# Patient Record
Sex: FEMALE | Race: WHITE | NOT HISPANIC OR LATINO | URBAN - METROPOLITAN AREA
[De-identification: names, ages, dates, MRNs, and addresses within clinical notes are randomized per-mention and may not be internally consistent; named-entity substitution may affect disease eponyms.]

---

## 2024-11-07 ENCOUNTER — LAB REQUISITION (OUTPATIENT)
Dept: DERMATOPATHOLOGY | Facility: CLINIC | Age: 39
End: 2024-11-07
Payer: COMMERCIAL

## 2024-11-07 DIAGNOSIS — D23.5 OTHER BENIGN NEOPLASM OF SKIN OF TRUNK: ICD-10-CM

## 2024-11-07 DIAGNOSIS — C43.72 MALIGNANT MELANOMA OF LEFT LOWER LIMB, INCLUDING HIP (MULTI): ICD-10-CM

## 2024-11-07 DIAGNOSIS — D23.61 OTHER BENIGN NEOPLASM OF SKIN OF RIGHT UPPER LIMB, INCLUDING SHOULDER: ICD-10-CM

## 2024-11-07 PROCEDURE — 88321 CONSLTJ&REPRT SLD PREP ELSWR: CPT | Performed by: DERMATOLOGY

## 2024-11-08 LAB
PATH REPORT.FINAL DX SPEC: NORMAL
PATH REPORT.GROSS SPEC: NORMAL
PATH REPORT.MICROSCOPIC SPEC OTHER STN: NORMAL
PATH REPORT.RELEVANT HX SPEC: NORMAL
PATH REPORT.TOTAL CANCER: NORMAL
PATHOLOGY SYNOPTIC REPORT: NORMAL

## 2024-11-10 DIAGNOSIS — C43.9 MELANOMA OF SKIN (MULTI): ICD-10-CM

## 2024-11-10 NOTE — TUMOR BOARD NOTE
General Patient Information  Name:  Gemma Riggs  Evaluation #:  1  Conference Date:  11/11/2024  YOB: 1985  MRN:  [id number]  Program Physician(s):  Neal Rodriguez  Referring Physician(s):  Rosa M Peterson      Summary   Stage:  Reji ([id number]) ; Melanoma 5 year survival: 99%    Assessment:  Malignant melanoma of the left anterior distal thigh, Breslow thickness 0.2 mm in association with a dermal nevus, without ulceration.    Recommendation:  WLE with 1 cm margins.    Review Multidisciplinary Cutaneous Oncology Conference recommendation with patient.  Continue routine follow up and total body skin exams with Rosa M Saunders.    Follow Up:  Malini Rubi.      History and Physical Exam  Dermatologic History:   39 y.o. female with a biopsy of the left anterior distal thigh on 10/22/2024 showing a a non-ulcerated melanoma, superficial spreading type, Breslow: 0.2 mm.    She is scheduled for a WLE with Dr. Stone on 11/20/2024.        Pathology  Derm Consult: [id number]  Order: [id number]   Collected 11/7/2024 12:41       Status: Final result       Visible to patient: Yes (seen)       Dx: Other benign neoplasm of skin of trun...    0 Result Notes      Component    FINAL DIAGNOSIS   8 SLIDES, DERMATOPATHOLOGY LAB[facility]ATORY OF [facility], #[id number] (BX: 10/22/2024)     A. SKIN, LEFT ANTERIOR DISTAL THIGH, SHAVE BIOPSY:  MALIGNANT MELANOMA, BRESLOW THICKNESS 0.2 MM IN ASSOCIATION WITH A DERMAL NEVUS, PRESENT ON THE DEEP AND PERIPHERAL MARGIN, SEE NOTE.     Note: Microscopic examination reveals a specimen that extends into the superficial dermis. There is an asymmetric proliferation of nested and single atypical melanocytes throughout all layers of the epidermis. There are occasional atypical melanocytes in the superficial dermis in addition to more benign appearing melanocytes. The melanocytes stain with antibodies against SOX-10. A MART1/Ki-67 stain was reviewed. The atypical  melanocytes stain weakly with antibodies against p16, while the benign appearing melanocytes stain strongly with antibodies against p16. The epidermal melanocytes stain with antibodies against PRAME as do some of the dermal melanocytes.      B. SKIN, RIGHT SUPERIOR UPPER BACK, SHAVE BIOPSY:  DERMAL NEVUS, PRESENT ON THE DEEP MARGIN.     C. SKIN, RIGHT POSTERIOR SHOULDER, SHAVE BIOPSY:  DERMAL NEVUS, PRESENT ON THE DEEP MARGIN.     ** Electronically signed out by Christopher Tavera MD **            Electronically signed by Christopher Tavera MD on 11/8/2024 at 1520   Case Summary Report   MELANOMA OF THE SKIN: Biopsy   8th Edition - Protocol posted: 3/23/2022MELANOMA OF THE SKIN: BIOPSY - All Specimens  SPECIMEN   Procedure  Biopsy, shave   Specimen Laterality  Left   TUMOR   Tumor Site  Skin of lower limb and hip: Left anterior distal thigh        Histologic Type  Superficial spreading melanoma (low-cumulative sun damage (CSD) melanoma)   Maximum Tumor (Breslow) Thickness (Millimeters)  0.2 mm   Ulceration  Not identified   Anatomic (Anand) Level  II (melanoma present in but does not fill and expand papillary dermis)   Mitotic Rate  None identified   Microsatellite(s)  Not identified   Lymphovascular Invasion  Not identified   Neurotropism  Not identified   Tumor-Infiltrating Lymphocytes  Present, nonbrisk   Tumor Regression  Not identified   MARGINS     Margin Status for Invasive Melanoma  All margins negative for invasive melanoma   Margin Status for Melanoma in situ  Melanoma in situ present at margin   Margin(s) Involved by Melanoma in Situ  Peripheral     Deep   PATHOLOGIC STAGE CLASSIFICATION (pTNM, AJCC 8th Edition)     pT Category  pT1a   .

## 2024-11-11 ENCOUNTER — TUMOR BOARD CONFERENCE (OUTPATIENT)
Dept: HEMATOLOGY/ONCOLOGY | Facility: HOSPITAL | Age: 39
End: 2024-11-11
Payer: COMMERCIAL

## 2024-11-20 ENCOUNTER — APPOINTMENT (OUTPATIENT)
Dept: DERMATOLOGY | Facility: CLINIC | Age: 39
End: 2024-11-20
Payer: COMMERCIAL

## 2024-11-20 DIAGNOSIS — C43.72 MALIGNANT MELANOMA OF LEFT LOWER EXTREMITY (MULTI): ICD-10-CM

## 2024-11-20 PROCEDURE — 12032 INTMD RPR S/A/T/EXT 2.6-7.5: CPT | Performed by: DERMATOLOGY

## 2024-11-20 PROCEDURE — 11606 EXC TR-EXT MAL+MARG >4 CM: CPT | Performed by: DERMATOLOGY

## 2024-11-20 PROCEDURE — 99204 OFFICE O/P NEW MOD 45 MIN: CPT | Performed by: DERMATOLOGY

## 2024-11-20 RX ORDER — FLUOXETINE HYDROCHLORIDE 60 MG/1
TABLET, FILM COATED ORAL; ORAL
COMMUNITY

## 2024-11-20 NOTE — PROGRESS NOTES
"Excision Operative Note    Date of Surgery:  11/20/2024  Surgeon:  Malini Stone MD  Office Location:  [address] [facility] [place]  [address] [facility] [address]  [facility] [address]  Dept: [phone]  Dept Fax: [phone]  Referring Provider: Dr. Rosa M Mayorga   Gemma Riggs is a 39 y.o. female who presents for the following: Excision (Left Anterior Distal Thigh- Melanoma) for melanoma.    According to the patient, the lesion has been present for approximately greater than 1 year at the time of diagnosis.  The lesion is not causing symptoms.  The lesion has not been treated previously.    The patient does not have a pacemaker / defibrillator.  The patient does not have a heart valve / joint replacement.    The patient is not on blood thinners.   The patient does not have a history of hepatitis B or C.  The patient does not have a history of HIV.  The patient does not have a history of immunosuppression (e.g. organ transplantation, malignancy, medications)    Is it okay to leave a phone message with results? yes  Who else may we leave results with: (name, relationship) n/a    The following portions of the chart were reviewed this encounter and updated as appropriate:         Assessment/Plan   Pre-procedure:   Obtained informed consent: written from patient  The surgical site was identified and confirmed with the patient.     Intra-operative:   Audible time out called at : 2:33 PM 11/20/24  by: NIKITA TERESA RN   Verified patient name, birthdate, site, specimen bottle label & requisition.    The planned procedure(s) was again reviewed with the patient. The risks of bleeding, infection, nerve damage and scarring were reviewed. The patient identity, surgical site, and planned procedure(s) were verified.     Biopsy Accession Number: [id number] \"outside path\"  Malignant melanoma of left lower extremity (Multi)  Left Anterior Distal Thigh    Skin excision    Lesion length (cm):  " 2.5  Lesion width (cm):  3  Margin per side (cm):  1  Total excision diameter (cm):  5  Informed consent: discussed and consent obtained    Timeout: patient name, date of birth, surgical site, and procedure verified    Procedure prep:  Patient was prepped and draped  Anesthesia: the lesion was anesthetized in a standard fashion    Anesthetic:  Lidocaine 2% with epinephrine  Instrument used: #15 blade    Hemostasis achieved with: electrodesiccation    Outcome: patient tolerated procedure well with no complications    Post-procedure details: sterile dressing applied and wound care instructions given    Dressing type: pressure dressing, Telfa pad and Hypafix    Additional details:  Melanoma Jessica:   Curative Intent: Yes  Original Breslow Thickness: 0.4 mm  Clinical margin width: 1 cm  Depth of excision: Full thickness     Skin repair  Complexity:  Intermediate  Final length (cm):  5  Informed consent: discussed and consent obtained    Timeout: patient name, date of birth, surgical site, and procedure verified    Procedure prep:  Patient prepped in sterile fashion  Anesthesia: the lesion was anesthetized in a standard fashion    Anesthetic:  2% lidocaine w/ epinephrine 1-100,000 local infiltration  Reason for type of repair: reduce tension to allow closure    Undermining: edges undermined    Subcutaneous layers (deep stitches):   Suture size:  3-0  Suture type: Vicryl (polyglactin 910)    Stitches:  Buried vertical mattress  Fine/surface layer approximation (top stitches):   Suture size:  3-0  Suture type: Prolene (polypropylene)    Stitches: simple running    Suture removal (days):  13  Hemostasis achieved with: electrodesiccation  Outcome: patient tolerated procedure well with no complications    Post-procedure details: sterile dressing applied and wound care instructions given    Dressing type: pressure dressing      Specimen 1 - Dermatopathology- DERM LAB  Differential Diagnosis: Malignant Melanoma (Outside  path)  Check Margins Yes/No?:  Yes  Dermpath Lab: Routine Histopathology (formalin-fixed tissue)    Intermediate Linear Repair:  Given the location and size of the defect, it was determined that an intermediate layered linear closure was required to restore normal anatomy and function. The repair is an intermediate closure as two layers of sutures were required. The defect was undermined extensively at the level of the subcutaneous plane. Standing cutaneous cones were removed using Burow's triangles. The wound edges were brought into close approximation with buried vertical mattress sutures. The remainder of the wound was then closed with epidermal top sutures.    The final repair measured 5 cm        Wound care was discussed, and the patient was given written post-operative wound care instructions.      The patient will follow up with Malini Stone MD as needed for any post operative problems or concerns, and will follow up with their primary dermatologist as scheduled.

## 2024-11-20 NOTE — PROGRESS NOTES
Melanoma Excision Consult Note    Date of Surgery: 11/20/2024  Surgeon:  Malini Stone MD  Office Location:  [address] [facility]  [address] [facility] [address]  [facility] [address]  Dept: [phone]  Dept Fax: [phone]   Referring Provider: Dr. Nicky Mayorga   Gemma Riggs is a 39 y.o. female who presents for the following: Excision (Left Anterior Distal Thigh- Melanoma) for melanoma.    According to the patient, the lesion has been present for approximately greater than 1 year at the time of diagnosis.  The lesion is not causing symptoms.  The lesion has not been treated previously.    The patient does not have a pacemaker / defibrillator.  The patient does not have a heart valve / joint replacement.  The patient is not on blood thinners.    The following portions of the chart were reviewed this encounter and updated as appropriate:       Review of Systems:  No other skin or systemic complaints other than what is documented elsewhere in the note.    Medical History:  Clinically relevant history including significant past medical history, review of systems, medications and allergies was reviewed and is documented in Epic.    Objective   Well appearing patient in no apparent distress; mood and affect are within normal limits.  Vital signs: See record.  Noted on the Left Anterior Distal Thigh  Is a 2.5 x 3 cm scar    The patient confirmed the identified site.    Discussion:  The nature of the diagnosis was explained. The lesion is an early melanoma but is likely to have been present for >1 year and is likely to progress without treatment. The multidisciplinary cutaneous oncology tumor board report was reviewed with the patient and surgery is recommended. The patient was informed that based on the depth and lack of ulceration, a sentinel lymph node biopsy is not indicated. However, the melanoma may be upstaged after excision following histopathologic examination, which may require  additional treatment. Warning signs of melanoma were discussed. We recommended that the patient have regular total body skin exams given increased risk of skin cancers. The patient was instructed to use sun protective behaviors including use of broad spectrum sunscreens and sun protective clothing to reduce the risk of skin cancers.     Surgery was recommended and discussed with the patient. The risks, benefits and potential adverse effects were reviewed and the patient voiced understanding. Discussion included but was not limited to the risks of bleeding and infection, likely scar outcome, possible need for revision surgery, cure rate, wound care requirements, activity restrictions and time to heal. Reconstruction options, risks and benefits were reviewed including second intention healing and linear repair (4-1 ratio was explained). It was explained that the scar would be longer than the original lesion.    Medical Decision Making:    Column 1 - chronic illness with progression  Column 2 - category 3: discussion of management with external physicians (multidisciplinary tumor board)  Column 3 - decision regarding minor surgery with identified risk factors (bleeding, infection, scarring)

## 2024-11-22 ENCOUNTER — TELEPHONE (OUTPATIENT)
Dept: DERMATOLOGY | Facility: CLINIC | Age: 39
End: 2024-11-22
Payer: COMMERCIAL

## 2024-11-22 LAB
LABORATORY COMMENT REPORT: NORMAL
PATH REPORT.FINAL DX SPEC: NORMAL
PATH REPORT.GROSS SPEC: NORMAL
PATH REPORT.MICROSCOPIC SPEC OTHER STN: NORMAL
PATH REPORT.RELEVANT HX SPEC: NORMAL
PATH REPORT.TOTAL CANCER: NORMAL

## 2024-11-22 NOTE — RESULT ENCOUNTER NOTE
Walter Riggs - your results show the skin cancer has been completely removed. No further treatment required. Continue skin checks every 3-4 months with your primary dermatologist. Please call/message us with any questions/concerns.

## 2024-11-22 NOTE — TELEPHONE ENCOUNTER
Surgical site: LEFT ANTERIOR DISTAL THIGH  Date of procedure 11/20/2024    A voicemail was left for the patient about the pathology result which showed clear margins. No further surgery needed but the patient was advised to follow up with general dermatology. The patient was advised to call with any questions.

## 2024-12-01 ASSESSMENT — DERMATOLOGY QUALITY OF LIFE (QOL) ASSESSMENT
WHAT SINGLE SKIN CONDITION LISTED BELOW IS THE PATIENT ANSWERING THE QUALITY-OF-LIFE ASSESSMENT QUESTIONS ABOUT: NONE OF THE ABOVE
WHAT SINGLE SKIN CONDITION LISTED BELOW IS THE PATIENT ANSWERING THE QUALITY-OF-LIFE ASSESSMENT QUESTIONS ABOUT: NONE OF THE ABOVE

## 2024-12-03 ENCOUNTER — APPOINTMENT (OUTPATIENT)
Dept: DERMATOLOGY | Facility: CLINIC | Age: 39
End: 2024-12-03
Payer: COMMERCIAL

## 2024-12-03 DIAGNOSIS — Z48.02 VISIT FOR SUTURE REMOVAL: ICD-10-CM

## 2024-12-03 PROCEDURE — 99024 POSTOP FOLLOW-UP VISIT: CPT | Performed by: DERMATOLOGY

## 2024-12-03 NOTE — PROGRESS NOTES
Office Follow Up Note    Visit Summary  Chief Complaint    1. Complaint Wound check.    Gemma Riggs is a 39 y.o. female who presents for 2 week follow up after surgery for a melanoma. The patient has no concerns today.     Location Operation site location: left anterior distal thigh    On exam,  Ms. Riggs is well-appearing and in no apparent distress. The surgical site appears clean with moderate, blanching erythema. Mild tenderness, good wound edge apposition except for small stressed area in the center of suture line.     Assessment and Plan:  The patient was reassured that the wound is healing appropriately.   + Does not appear to be infected  Sutures were removed without complication today.  The dressing was removed, the wound cleaned a a new dressing reapplied.   Dr. Walsh assessed patient, recommended ace wrapping area until fully healed especially during workouts.    Heri Walsh MD, PhD

## 2025-02-23 DIAGNOSIS — C43.9 MELANOMA OF SKIN (MULTI): ICD-10-CM

## 2025-02-23 NOTE — TUMOR BOARD NOTE
General Patient Information  Name:  Gemma Riggs  Evaluation #:  2  Conference Date:  2/24/2025  YOB: 1985  MRN:  [id number]  Program Physician(s):  Neal Rodriguez  Referring Physician(s):  Rosa M Peterson      Summary   Stage:  IA ([id number]) ; Melanoma 5 year survival: 99%    Assessment:  Malignant melanoma of the left anterior distal thigh, Breslow thickness 0.2 mm in association with a dermal nevus, without ulceration. S/p WLE with 1 cm margins. Adequately surgically treated.    Recommendation: Annual H&P.    Review Multidisciplinary Cutaneous Oncology Conference recommendation with patient.  Continue routine follow up and total body skin exams with Rosa M Saunders.    Follow Up:  Rosa M Saunders      History and Physical Exam  Dermatologic History:   39 y.o. female with a biopsy of the left anterior distal thigh on 10/22/2024 showing a a non-ulcerated melanoma, superficial spreading type, Breslow: 0.2 mm. She underwent a WLE with   1 cm margins on 11/20/2025 which showed changes consistent with previous procedure, inked margins free in the planes of sections examined without residual atypical melanocytic neoplasm seen.          Pathology  Dermatopathology- DERM LAB: [id number]  Order: [id number]   Collected 11/20/2024 13:54       Status: Final result       Visible to patient: Yes (not seen)       Dx: Malignant melanoma of left lower extr...    1 Result Note       1 Patient Communication      Component    FINAL DIAGNOSIS   SKIN, LEFT ANTERIOR DISTAL THIGH, EXCISION:  CHANGES CONSISTENT WITH PREVIOUS PROCEDURE, INKED MARGINS FREE IN THE PLANES OF SECTIONS EXAMINED, WITHOUT RESIDUAL ATYPICAL MELANOCYTIC NEOPLASM SEEN.     ** Electronically signed out by Christopher Tavera MD **          Derm Consult: [id number]  Order: [id number]   Collected 11/7/2024 12:41       Status: Final result       Visible to patient: Yes (seen)       Dx: Other benign neoplasm of skin of trun...    0 Result Notes      Component     FINAL DIAGNOSIS   8 SLIDES, DERMATOPATHOLOGY [facility]ATORY OF [facility], #[id number] (BX: 10/22/2024)     A. SKIN, LEFT ANTERIOR DISTAL THIGH, SHAVE BIOPSY:  MALIGNANT MELANOMA, BRESLOW THICKNESS 0.2 MM IN ASSOCIATION WITH A DERMAL NEVUS, PRESENT ON THE DEEP AND PERIPHERAL MARGIN, SEE NOTE.     Note: Microscopic examination reveals a specimen that extends into the superficial dermis. There is an asymmetric proliferation of nested and single atypical melanocytes throughout all layers of the epidermis. There are occasional atypical melanocytes in the superficial dermis in addition to more benign appearing melanocytes. The melanocytes stain with antibodies against SOX-10. A MART1/Ki-67 stain was reviewed. The atypical melanocytes stain weakly with antibodies against p16, while the benign appearing melanocytes stain strongly with antibodies against p16. The epidermal melanocytes stain with antibodies against PRAME as do some of the dermal melanocytes.      B. SKIN, RIGHT SUPERIOR UPPER BACK, SHAVE BIOPSY:  DERMAL NEVUS, PRESENT ON THE DEEP MARGIN.     C. SKIN, RIGHT POSTERIOR SHOULDER, SHAVE BIOPSY:  DERMAL NEVUS, PRESENT ON THE DEEP MARGIN.     ** Electronically signed out by Christopher Tavera MD **            Electronically signed by Christopher Tavera MD on 11/8/2024 at 1520   Case Summary Report   MELANOMA OF THE SKIN: Biopsy   8th Edition - Protocol posted: 3/23/2022MELANOMA OF THE SKIN: BIOPSY - All Specimens  SPECIMEN   Procedure  Biopsy, shave   Specimen Laterality  Left   TUMOR   Tumor Site  Skin of lower limb and hip: Left anterior distal thigh        Histologic Type  Superficial spreading melanoma (low-cumulative sun damage (CSD) melanoma)   Maximum Tumor (Breslow) Thickness (Millimeters)  0.2 mm   Ulceration  Not identified   Anatomic (Anand) Level  II (melanoma present in but does not fill and expand papillary dermis)   Mitotic Rate  None identified   Microsatellite(s)  Not identified   Lymphovascular Invasion   Not identified   Neurotropism  Not identified   Tumor-Infiltrating Lymphocytes  Present, nonbrisk   Tumor Regression  Not identified   MARGINS     Margin Status for Invasive Melanoma  All margins negative for invasive melanoma   Margin Status for Melanoma in situ  Melanoma in situ present at margin   Margin(s) Involved by Melanoma in Situ  Peripheral     Deep   PATHOLOGIC STAGE CLASSIFICATION (pTNM, AJCC 8th Edition)     pT Category  pT1a   .

## 2025-02-24 ENCOUNTER — TUMOR BOARD CONFERENCE (OUTPATIENT)
Dept: HEMATOLOGY/ONCOLOGY | Facility: HOSPITAL | Age: 40
End: 2025-02-24
Payer: COMMERCIAL